# Patient Record
Sex: MALE | Race: BLACK OR AFRICAN AMERICAN | NOT HISPANIC OR LATINO | Employment: OTHER | URBAN - METROPOLITAN AREA
[De-identification: names, ages, dates, MRNs, and addresses within clinical notes are randomized per-mention and may not be internally consistent; named-entity substitution may affect disease eponyms.]

---

## 2019-01-01 ENCOUNTER — APPOINTMENT (OUTPATIENT)
Dept: LAB | Facility: HOSPITAL | Age: 59
End: 2019-01-01

## 2019-01-01 ENCOUNTER — CONSULT (OUTPATIENT)
Dept: ONCOLOGY | Facility: CLINIC | Age: 59
End: 2019-01-01

## 2019-01-01 VITALS
DIASTOLIC BLOOD PRESSURE: 84 MMHG | TEMPERATURE: 97.5 F | BODY MASS INDEX: 27.37 KG/M2 | RESPIRATION RATE: 16 BRPM | HEART RATE: 79 BPM | WEIGHT: 174.4 LBS | SYSTOLIC BLOOD PRESSURE: 174 MMHG | HEIGHT: 67 IN

## 2019-01-01 DIAGNOSIS — C64.1 RENAL CELL CARCINOMA OF RIGHT KIDNEY (HCC): Primary | ICD-10-CM

## 2019-01-01 PROCEDURE — 99205 OFFICE O/P NEW HI 60 MIN: CPT | Performed by: INTERNAL MEDICINE

## 2019-01-01 RX ORDER — HYDROCHLOROTHIAZIDE 25 MG/1
25 TABLET ORAL DAILY
COMMUNITY
Start: 2018-07-30

## 2019-01-01 RX ORDER — FLUDROCORTISONE ACETATE 0.1 MG/1
TABLET ORAL
COMMUNITY
Start: 2019-01-01

## 2019-01-01 RX ORDER — ATORVASTATIN CALCIUM 80 MG/1
TABLET, FILM COATED ORAL
COMMUNITY
Start: 2019-01-01

## 2019-01-01 RX ORDER — CLONIDINE HYDROCHLORIDE 0.1 MG/1
TABLET ORAL
COMMUNITY
Start: 2019-01-01

## 2019-01-01 RX ORDER — HYDROCODONE BITARTRATE AND ACETAMINOPHEN 5; 325 MG/1; MG/1
1 TABLET ORAL EVERY 4 HOURS PRN
Refills: 0 | COMMUNITY
Start: 2019-01-01

## 2019-01-01 RX ORDER — FUROSEMIDE 20 MG/1
TABLET ORAL
COMMUNITY
Start: 2018-12-11

## 2019-01-01 RX ORDER — AMLODIPINE BESYLATE 10 MG/1
TABLET ORAL
COMMUNITY
Start: 2019-01-01

## 2019-12-13 NOTE — PROGRESS NOTES
Hematology/Oncology Outpatient Consultation    Patient name: Dangelo Vela  : 1960  MRN: [id number]  Primary Care Physician: Chikis Aviles MD  Referring Physician: Chikis Aviles,*  Reason For Consult:     Chief Complaint   Patient presents with   • Consult     Second opinion due to metastatic renal cell carcinoma       History of Present Illness:    This is a 59-year-old male who has a history of metastatic renal cell carcinoma that was originally diagnosed in .  Patient at the time had presented with spinal disease and underwent spinal surgery for spinal cord stabilization.  Following the surgery patient  underwent right nephrectomy at the [facility].  Following that patient was placed on Sutent for systemic control for his metastatic renal cell carcinoma.  During the course of his illness with progression he was placed on a different oral medication which patient does not recall and I do not have records to review.  Patient currently was then placed on Opdivo which seemed to control his disease up until a few months ago when he developed pancreatitis due to Opdivo.      In the interim patient was diagnosed with prostate cancer for which she would receive radiation treatment and hormonal blockade.  He underwent left adrenalectomy in  at the [facility] with pathology showing metastatic renal cell carcinoma.  Patient was also diagnosed with low-grade papillary carcinoma of the bladder in 2018 which was treated with TURBT.    4 restaging of his disease he had a CT scan of the abdomen and pelvis done 2019 which showed multiple densities in the liver, a new lytic lesion involving the right superior acetabular area measuring 2.7 cm compatible with metastatic disease.  A bone scan also done subsequently showed increased uptake in the right distal femur, right superior acetabulum and lower spinal lumbar spine.  Patient was seen by   [facility] and underwent palliative radiation to the right hip total dose of 3000 cGy in 10 fractions.  Patient tells me that there is plans to restart Opdivo in the near future.  Patient has traveled from [place] to see me to review his  medical records and to make any specific recommendations.    He continues to experience pain involving the right hip though it is better since he has received radiation treatments.  He is able to ambulate with the help of a cane.  He continues to experience significant weakness due to decrease mobility and pain issues.  Patient is accompanied by his mother, wife and sister for this appointment.    Patient is also asking if there is a role for surgery for the right femur as he continues to experience significant pain.  He has not been seen by Ortho oncologist.    Past Medical History:   Diagnosis Date   • Bladder cancer (CMS/HCC)    • Cancer of kidney (CMS/HCC)        Past Surgical History:   Procedure Laterality Date   • ADRENALECTOMY     • NEPHRECTOMY           Current Outpatient Medications:   •  amLODIPine (NORVASC) 10 MG tablet, , Disp: , Rfl:   •  atorvastatin (LIPITOR) 80 MG tablet, , Disp: , Rfl:   •  cloNIDine (CATAPRES) 0.1 MG tablet, , Disp: , Rfl:   •  fludrocortisone 0.1 MG tablet, , Disp: , Rfl:   •  furosemide (LASIX) 20 MG tablet, TAKE ONE TABLET BY MOUTH EVERY DAY AS NEEDED for swelling, Disp: , Rfl:   •  hydroCHLOROthiazide (HYDRODIURIL) 25 MG tablet, Take 25 mg by mouth Daily., Disp: , Rfl:   •  HYDROcodone-acetaminophen (NORCO) 5-325 MG per tablet, Take 1 tablet by mouth Every 4 (Four) Hours As Needed. for pain, Disp: , Rfl: 0    No Known Allergies      There is no immunization history on file for this patient.    Family History   Problem Relation Age of Onset   • Lung cancer Father    • Breast cancer Sister    • Throat cancer Sister    • Lung cancer Brother    • Breast cancer Sister    • Brain cancer Maternal Uncle        Cancer-related family history includes Brain  "cancer in his maternal uncle; Breast cancer in his sister and sister; Lung cancer in his brother and father; Throat cancer in his sister.    Social History     Tobacco Use   • Smoking status: Former Smoker   • Smokeless tobacco: Never Used   Substance Use Topics   • Alcohol use: Never     Frequency: Never   • Drug use: Not Currently     Types: Marijuana       ROS:    Review of Systems   Constitutional: Negative for chills and fever.   HENT: Negative for ear pain, mouth sores, nosebleeds and sore throat.    Eyes: Negative for photophobia and visual disturbance.        Eye glasses   Respiratory: Negative for wheezing and stridor.    Cardiovascular: Negative for chest pain and palpitations.   Gastrointestinal: Negative for abdominal pain, diarrhea, nausea and vomiting.   Endocrine: Negative for cold intolerance and heat intolerance.   Genitourinary: Negative for dysuria and hematuria.   Musculoskeletal: Positive for gait problem (Ambulate with cane). Negative for joint swelling and neck stiffness.   Skin: Negative for color change and rash.   Neurological: Negative for seizures and syncope.   Hematological: Negative for adenopathy.        No obvious bleeding   Psychiatric/Behavioral: Negative for agitation, confusion and hallucinations.       Objective:    Vitals:    12/16/19 0839   BP: 174/84   Pulse: 79   Resp: 16   Temp: 97.5 °F (36.4 °C)   Weight: 79.1 kg (174 lb 6.4 oz)   Height: 170.2 cm (67\")   PainSc:   3   PainLoc: Hip  Comment: chronic right hip pain       ECOG  (1) Restricted in physically strenuous activity, ambulatory and able to do work of light nature    Physical Exam:    Physical Exam   Constitutional: He is oriented to person, place, and time. No distress.   HENT:   Head: Normocephalic and atraumatic.   Eyes: Conjunctivae and EOM are normal. Right eye exhibits no discharge. Left eye exhibits no discharge. No scleral icterus.   Eye glasses   Neck: Normal range of motion. Neck supple. No thyromegaly " present.   Cardiovascular: Normal rate, regular rhythm and normal heart sounds. Exam reveals no gallop and no friction rub.   Pulmonary/Chest: Effort normal. No stridor. No respiratory distress. He has no wheezes.   Abdominal: Soft. Bowel sounds are normal. He exhibits no mass. There is no tenderness. There is no rebound and no guarding.   mulitple abdominal scars from previous surgeries   Musculoskeletal: Normal range of motion. He exhibits no tenderness.   Lymphadenopathy:     He has no cervical adenopathy.   Neurological: He is alert and oriented to person, place, and time. He exhibits normal muscle tone.   Skin: Skin is warm. No rash noted. He is not diaphoretic. No erythema.   Psychiatric: He has a normal mood and affect. His behavior is normal.   Nursing note and vitals reviewed.      RECENT LABS  WBC   Date Value Ref Range Status   09/04/2018 7.63 3.70 - 11.00 k/uL Final     RBC   Date Value Ref Range Status   09/04/2018 3.01 (L) 4.20 - 6.00 m/uL Final     Hemoglobin   Date Value Ref Range Status   09/04/2018 7.5 (L) 13.0 - 17.0 g/dL Final   08/30/2018 6.9 (L) 13.0 - 17.0 g/dL Final     Hematocrit   Date Value Ref Range Status   09/04/2018 25.4 (L) 39.0 - 51.0 % Final     MCV   Date Value Ref Range Status   09/04/2018 84.4 80.0 - 100.0 fL Final     MCH   Date Value Ref Range Status   09/04/2018 24.9 (L) 26.0 - 34.0 pG Final     MCHC   Date Value Ref Range Status   09/04/2018 29.5 (L) 30.5 - 36.0 g/dL Final     RDW   Date Value Ref Range Status   09/04/2018 18.9 (H) 11.5 - 15.0 % Final     MPV   Date Value Ref Range Status   09/04/2018 9.5 9.0 - 12.7 fL Final     Platelets   Date Value Ref Range Status   09/04/2018 374 150 - 400 k/uL Final     Neutrophil Rel %   Date Value Ref Range Status   08/30/2018 92.2 % Final     Lymphocyte Rel %   Date Value Ref Range Status   08/30/2018 3.6 % Final     Monocyte Rel %   Date Value Ref Range Status   08/30/2018 4.1 % Final     Eosinophil %   Date Value Ref Range Status    08/30/2018 0.0 % Final     Basophil Rel %   Date Value Ref Range Status   08/30/2018 0.1 % Final     Neutrophils Absolute   Date Value Ref Range Status   08/30/2018 6.92 1.45 - 7.50 k/uL Final     Lymphocytes Absolute   Date Value Ref Range Status   08/30/2018 0.27 (L) 1.00 - 4.00 k/uL Final     Monocytes Absolute   Date Value Ref Range Status   08/30/2018 0.31 <0.87 k/uL Final     Eosinophils Absolute   Date Value Ref Range Status   08/30/2018 <0.03 <0.46 k/uL Final     Basophils Absolute   Date Value Ref Range Status   08/30/2018 <0.03 <0.11 k/uL Final       Lab Results   Component Value Date    BUN 13 09/04/2018    CREATININE 1.22 09/04/2018    EGFRIFAFRI >60 09/04/2018    K 3.3 (L) 09/04/2018    CO2 29 09/04/2018    CALCIUM 7.6 (L) 09/04/2018    ALBUMIN 3.6 (L) 08/22/2018    AST 30 08/22/2018    ALT 17 08/22/2018         Assessment/Plan     There are no diagnoses linked to this encounter.    1. Metastatic renal cell carcinoma metastases to the spine, right femur and hip.  2. Status post lumbar spine surgery for metastatic disease, status post adrenalectomy due to mets.  3. Status post recent palliative radiation to the right hip and pelvis due to metastatic disease  4. History of prostate cancer status post radiation therapy followed by hormonal treatment  5. History of superficial bladder tumor status post see TURBT  6. Distant pain involving the right femur and pelvis  7. Personal history and strong family history of multiple malignancies.  Patient may benefit from genetic evaluation and testing      Plans:  I have recommended a follow-up CT of the chest for restaging.  It is unclear to me the etiology of the multiple densities in the liver.  I do not have access to previous CT scans that were done to see if these areas are new or if there had been present in the past.  The CT of the chest is negative and patient hepatic lesions are old and nonprogressive then continue with Opdivo may be the way to go so  long as he does not develop significant toxicities on it.  Does show evidence of progression in the chest then systemic therapy would need to be changed.  Votrient may be an option for him but without seeing all his records to know exactly what he has received in the past will be difficult to make any specific recommendations.    Patient is also asking if surgery could be an option for him for his right femoral lesion.  I have asked him to follow-up with Ortho oncology at [facility] to help evaluate that.    I have also asked him to consider going up to [facility] to see Dr. Mat Hassan to see if there is any clinical trial that he may qualify for.    Patient will follow-up with his treating medical oncologist in [place].    All his questions have been answered to the best of my abilities.    I have reviewed labs results, imaging, vitals, and medications with the patient today.  Will follow up prn    Patient verbalized understanding and is in agreement of the above plan.    Part of this document was scribed by Magnolia Angela RN, BSN.

## 2020-01-01 ENCOUNTER — TELEPHONE (OUTPATIENT)
Dept: ONCOLOGY | Facility: CLINIC | Age: 60
End: 2020-01-01